# Patient Record
Sex: MALE | Race: WHITE | Employment: FULL TIME | URBAN - METROPOLITAN AREA
[De-identification: names, ages, dates, MRNs, and addresses within clinical notes are randomized per-mention and may not be internally consistent; named-entity substitution may affect disease eponyms.]

---

## 2020-01-14 ENCOUNTER — TELEPHONE (OUTPATIENT)
Dept: BARIATRICS/WEIGHT MGMT | Age: 45
End: 2020-01-14

## 2020-01-21 ENCOUNTER — HOSPITAL ENCOUNTER (OUTPATIENT)
Dept: WOUND CARE | Age: 45
Discharge: HOME | End: 2020-01-21
Payer: MEDICAID

## 2020-01-21 VITALS
SYSTOLIC BLOOD PRESSURE: 156 MMHG | HEART RATE: 82 BPM | RESPIRATION RATE: 15 BRPM | DIASTOLIC BLOOD PRESSURE: 97 MMHG | TEMPERATURE: 97.7 F

## 2020-01-21 PROBLEM — L97.929 TYPE 2 DIABETES MELLITUS WITH DIABETIC ULCER OF LEFT LOWER LEG (HCC): Status: ACTIVE | Noted: 2020-01-21

## 2020-01-21 PROBLEM — E11.622 TYPE 2 DIABETES MELLITUS WITH DIABETIC ULCER OF LEFT LOWER LEG (HCC): Status: ACTIVE | Noted: 2020-01-21

## 2020-01-21 PROBLEM — D84.9 IMMUNOSUPPRESSED STATUS (HCC): Status: ACTIVE | Noted: 2020-01-21

## 2020-01-21 PROCEDURE — 11042 DBRDMT SUBQ TIS 1ST 20SQCM/<: CPT

## 2020-01-21 PROCEDURE — 99203 OFFICE O/P NEW LOW 30 MIN: CPT | Performed by: NURSE PRACTITIONER

## 2020-01-21 PROCEDURE — 11042 DBRDMT SUBQ TIS 1ST 20SQCM/<: CPT | Performed by: NURSE PRACTITIONER

## 2020-01-21 PROCEDURE — 87071 CULTURE AEROBIC QUANT OTHER: CPT

## 2020-01-21 PROCEDURE — 87073 CULTURE BACTERIA ANAEROBIC: CPT

## 2020-01-21 PROCEDURE — 99213 OFFICE O/P EST LOW 20 MIN: CPT

## 2020-01-21 RX ORDER — FENOFIBRATE 48 MG/1
45 TABLET, COATED ORAL DAILY
COMMUNITY
Start: 2019-12-27 | End: 2020-02-11

## 2020-01-21 RX ORDER — FUROSEMIDE 40 MG/1
40 TABLET ORAL 2 TIMES DAILY
COMMUNITY
Start: 2019-12-19

## 2020-01-21 RX ORDER — VARENICLINE TARTRATE
0.5 KIT DAILY
COMMUNITY
Start: 2020-01-19

## 2020-01-21 RX ORDER — LABETALOL 200 MG/1
200 TABLET, FILM COATED ORAL 2 TIMES DAILY
COMMUNITY
Start: 2016-06-21

## 2020-01-21 RX ORDER — NIFEDIPINE 60 MG/1
60 TABLET, FILM COATED, EXTENDED RELEASE ORAL 2 TIMES DAILY
COMMUNITY
Start: 2016-09-20

## 2020-01-21 RX ORDER — GABAPENTIN 300 MG/1
300 CAPSULE ORAL 2 TIMES DAILY
COMMUNITY
Start: 2016-09-20

## 2020-01-21 RX ORDER — MYCOPHENOLIC ACID 360 MG/1
360 TABLET, DELAYED RELEASE ORAL 2 TIMES DAILY
COMMUNITY
Start: 2019-12-27

## 2020-01-21 RX ORDER — ENALAPRIL MALEATE 10 MG/1
10 TABLET ORAL NIGHTLY
COMMUNITY
Start: 2019-12-27 | End: 2020-02-11

## 2020-01-21 RX ORDER — SIROLIMUS 2 MG/1
2 TABLET, FILM COATED ORAL DAILY
COMMUNITY

## 2020-01-21 RX ORDER — HYDRALAZINE HYDROCHLORIDE 100 MG/1
100 TABLET, FILM COATED ORAL 2 TIMES DAILY
COMMUNITY
Start: 2018-05-22

## 2020-01-21 RX ORDER — PREDNISONE 10 MG/1
10 TABLET ORAL DAILY
COMMUNITY
Start: 2019-12-27

## 2020-01-21 NOTE — PROGRESS NOTES
[facility] Initial Visit      Juan Jose Noriega  AGE: 53 y.o. GENDER: male  : 1975  EPISODE DATE:  2020   Referred by: Self    Subjective:     CHIEF COMPLAINT WOUND LEFT LOWER LEG     HISTORY of PRESENT ILLNESS      Juan Jose Noriega is a 53 y.o. male who presents to the [address] [place] [address] for an initial visit for evaluation and treatment of Chronic diabetic  ulcer(s) of  Left lateral lower leg. The condition is of mild severity. The ulcer has been present for 10 weeks. The underlying cause is thought to be traumatic from the puncture of a nail into his left lower leg with delayed healing related to diabetes and immunosuppression. The patients care to date has included nothing. The patient is a kidney transplant patient and has had his Tetanus as part of the protocol. The patient has significant underlying medical conditions as below. Wound Pain Timing/Severity: mild  Quality of pain: aching  Severity of pain:  2 / 10   Modifying Factors: diabetes, smoking and immunosuppression  Associated Signs/Symptoms: pain        PAST MEDICAL HISTORY        Diagnosis Date    [facility] ([facility] [address] [facility]     Diabetes mellitus ([facility] [address] [facility]     Hyperlipidemia     Hypertension        PAST SURGICAL HISTORY    Past Surgical History:   Procedure Laterality Date    KIDNEY REMOVAL Left     KIDNEY TRANSPLANT         FAMILY HISTORY    History reviewed. No pertinent family history. SOCIAL HISTORY    Social History     Tobacco Use    Smoking status: Current Every Day Smoker     Packs/day: 1.00     Years: 30.00     Pack years: 30.00    Smokeless tobacco: Never Used   Substance Use Topics    Alcohol use: Not Currently    Drug use: Not on file       ALLERGIES    No Known Allergies    MEDICATIONS    No current outpatient medications on file prior to encounter. No current facility-administered medications on file prior to encounter.         PROBLEM LIST    Patient Active Problem List   Diagnosis    WD-Type 2 diabetes mellitus with diabetic ulcer of left lower leg (HCC)       REVIEW OF SYSTEMS    Constitutional: negative for anorexia, chills, fatigue, fevers, malaise and sweats  Respiratory: negative for cough, dyspnea on exertion, pleurisy/chest pain, shortness of breath, sputum, stridor and wheezing  Cardiovascular: negative for chest pain, chest pressure/discomfort, exertional chest pressure/discomfort, fatigue, irregular heart beat, palpitations, syncope and tachypnea  Integument/breast: positive for skin lesion(s)      Objective:      BP (!) 156/97   Pulse 82   Temp 97.7 °F (36.5 °C) (Temporal)   Resp 15     PHYSICAL EXAM  General Appearance: alert and oriented to person, place and time, well-developed and well-nourished, in no acute distress  Pulmonary/Chest: clear to auscultation bilaterally- no wheezes, rales or rhonchi, normal air movement, no respiratory distress  Cardiovascular: normal rate, normal S1 and S2, no gallops and intact distal pulses  Dermatologic exam: Visual inspection of the periwound reveals the skin to be normal in turgor and texture  Wound exam: see wound description below in procedure note      Assessment:       Juan Jose Noriega  appears to have a non-healing wound of the left lower lateral leg. The etiology of the wound is felt to be diabetic. There are multiple complicating factors including diabetes, smoking and anticoagulation therapy. A comprehensive wound management program would be helpful to heal this wound. Assessments completed include fall risk and nutritional, functional,and psychological status. At this time appropriate care would include: periodic debridement and wound care as below.      Problem List Items Addressed This Visit     WD-Type 2 diabetes mellitus with diabetic ulcer of left lower leg ([facility] [address] [facility]      Other Visit Diagnoses     Nonhealing skin ulcer, unspecified ulcer stage ([facility] [address]    -  Primary    Relevant Orders    VL DUP LOWER EXTREMITY ARTERIES BILATERAL    VL DUP LOWER EXTREMITY VENOUS BILATERAL    CBC WITH AUTO DIFFERENTIAL    Comprehensive Metabolic Panel, Fasting    HEMOGLOBIN A1C            Procedure Note    Indications:  Based on my examination of this patient's wound(s) today, sharp excision into necrotic subcutaneous tissue is required to promote healing and evaluate the extent of previous healing. Performed by: JOE Pruitt CNP    Consent obtained: Yes    Time out taken:  Yes    Pain Control: None needed       Debridement:Excisional Debridement    Using curette the wound(s) was/were sharply debrided down through and including the removal of subcutaneous tissue.         Devitalized Tissue Debrided:  fibrin, biofilm, slough, necrotic/eschar and exudate    Pre Debridement Measurements:  Are located in the Wound Documentation Flow Sheet    All active wounds listed below with today's date are evaluated  Wound(s)    debrided this date include # : 1     Post  Debridement Measurements:  Wound 01/21/20 Leg Posterior;Left;Lower #1 left posterior leg (Active)   Wound Image   1/21/2020 10:28 AM   Wound Cleansed Rinsed/Irrigated with saline 1/21/2020 10:28 AM   Wound Length (cm) 0.8 cm 1/21/2020 10:28 AM   Wound Width (cm) 0.5 cm 1/21/2020 10:28 AM   Wound Depth (cm) 0.1 cm 1/21/2020 10:28 AM   Wound Surface Area (cm^2) 0.4 cm^2 1/21/2020 10:28 AM   Wound Volume (cm^3) 0.04 cm^3 1/21/2020 10:28 AM   Post-Procedure Length (cm) 0.8 cm 1/21/2020 11:07 AM   Post-Procedure Width (cm) 0.5 cm 1/21/2020 11:07 AM   Post-Procedure Depth (cm) 0.1 cm 1/21/2020 11:07 AM   Post-Procedure Surface Area (cm^2) 0.4 cm^2 1/21/2020 11:07 AM   Post-Procedure Volume (cm^3) 0.04 cm^3 1/21/2020 11:07 AM   Distance Tunneling (cm) 0 cm 1/21/2020 10:28 AM   Tunneling Position ___ O'Clock 0 1/21/2020 10:28 AM   Undermining Starts ___ O'Clock 0 1/21/2020 10:28 AM   Undermining Ends___ O'Clock 0 1/21/2020 10:28 AM   Undermining Maxium Distance (cm) 0 1/21/2020 10:28 AM   Wound Assessment Nelson;Dry 1/21/2020 10:28 AM   Drainage Amount ordered by physician do not stop taking until medicine is all gone. Orders for this week:  1/21/20  Culture Obtained- 1/21/20  Arterial and Venous Studies ordered 1/21/20       Left Lower Leg--Clean with soap and water, pat dry. Apply Santyl Nickel thick to wound bed, cover with Fibracol and Border Gauze. Change Daily     Follow up with Yasmin CARTWRIGHT In 1 weeks in the wound care center  Call [phone] for any questions or concerns.   Date__________   Time____________        Treatment Note      Written Patient Dismissal Instructions Given          Electronically signed by JOE Pruitt CNP on 1/21/2020 at 11:16 AM

## 2020-01-24 ENCOUNTER — HOSPITAL ENCOUNTER (OUTPATIENT)
Dept: ULTRASOUND IMAGING | Age: 45
Discharge: HOME | End: 2020-01-24
Payer: COMMERCIAL

## 2020-01-24 PROCEDURE — 93970 EXTREMITY STUDY: CPT

## 2020-01-24 PROCEDURE — 93925 LOWER EXTREMITY STUDY: CPT

## 2020-01-25 LAB
CULTURE: NORMAL
Lab: NORMAL
SPECIMEN: NORMAL

## 2020-01-28 ENCOUNTER — HOSPITAL ENCOUNTER (OUTPATIENT)
Dept: WOUND CARE | Age: 45
Discharge: HOME | End: 2020-01-28
Payer: MEDICAID

## 2020-01-28 VITALS — DIASTOLIC BLOOD PRESSURE: 88 MMHG | SYSTOLIC BLOOD PRESSURE: 149 MMHG | RESPIRATION RATE: 18 BRPM | HEART RATE: 81 BPM

## 2020-01-28 PROCEDURE — 11042 DBRDMT SUBQ TIS 1ST 20SQCM/<: CPT

## 2020-01-28 PROCEDURE — 11042 DBRDMT SUBQ TIS 1ST 20SQCM/<: CPT | Performed by: NURSE PRACTITIONER

## 2020-01-28 NOTE — PROGRESS NOTES
Nonselective enzymatic debridement performed with Santyl per physician order to wound(s) of the left leg. Patient tolerated the procedure well.

## 2020-01-28 NOTE — PROGRESS NOTES
hydrALAZINE (APRESOLINE) 100 MG tablet Take 100 mg by mouth 2 times daily      labetalol (NORMODYNE) 200 MG tablet Take 200 mg by mouth 2 times daily      Mycophenolate Sodium 360 MG TBEC Take 360 mg by mouth 2 times daily      NIFEdipine (ADALAT CC) 60 MG extended release tablet Take 60 mg by mouth 2 times daily      predniSONE (DELTASONE) 10 MG tablet Take 10 mg by mouth daily      Sirolimus 2 MG TABS Take 2 mg by mouth daily      CHANTIX STARTING MONTH CINDY 0.5 MG X 11 & 1 MG X 42 tablet Take 0.5 mg by mouth daily       No current facility-administered medications on file prior to encounter. REVIEW OF SYSTEMS    Pertinent items are noted in HPI. Constitutional: Negative for systemic symptoms including fever, chills and malaise. Objective:      BP (!) 149/88   Pulse 81   Resp 18     PHYSICAL EXAM    General: The patient is in no acute distress. Mental status:  Patient is appropriate, is  oriented to place and plan of care. Dermatologic exam: Visual inspection of the periwound reveals the skin to be normal in turgor and texture  Wound exam: see wound description below in procedure note      Assessment:     Problem List Items Addressed This Visit     WD-Type 2 diabetes mellitus with diabetic ulcer of left lower leg ([facility] [address] - Primary    Immunosuppressed status ([facility] [address]        Procedure Note    Indications:  Based on my examination of this patient's wound(s) today, sharp excision into necrotic subcutaneous tissue is required to promote healing and evaluate the extent of previous healing. Performed by: JOE Otoole - CNP    Consent obtained: Yes    Time out taken:  Yes    Pain Control: 4% Lidocaine Liquid Topical     Debridement:Excisional Debridement    Using curette the wound(s) was/were sharply debrided down through and including the removal of subcutaneous tissue.         Devitalized Tissue Debrided:  biofilm, slough and exudate    Pre Debridement Measurements:  Are located in the standing for long periods of time. Apply wraps/stockings in AM and remove at bedtime. If swelling is present, elevate legs to the level of the heart or above for 30 minutes 4-5 times a day and/or when sitting. When taking antibiotics take entire prescription as ordered by physician do not stop taking until medicine is all gone.                                                           Orders for this week:  1/28/20  Culture Obtained- 1/21/20  Arterial and Venous Studies ordered 1/21/20                   Left Lower Leg--Clean with soap and water, pat dry. Apply Santyl Nickel thick to wound bed, cover with Fibracol and Border Gauze. Change Daily      Follow up with Yasmin CARTWRIGHT In 1 weeks in the wound care center  Call [phone] for any questions or concerns.   Date__________   Time____________        Treatment Note      Written Patient Dismissal Instructions Given            Electronically signed by JOE Hill CNP on 1/28/2020 at 10:53 AM

## 2020-02-06 ENCOUNTER — HOSPITAL ENCOUNTER (OUTPATIENT)
Dept: WOUND CARE | Age: 45
Discharge: HOME | End: 2020-02-06
Payer: COMMERCIAL

## 2020-02-06 VITALS
TEMPERATURE: 98.6 F | RESPIRATION RATE: 18 BRPM | DIASTOLIC BLOOD PRESSURE: 81 MMHG | SYSTOLIC BLOOD PRESSURE: 134 MMHG | HEART RATE: 81 BPM

## 2020-02-06 PROCEDURE — 11042 DBRDMT SUBQ TIS 1ST 20SQCM/<: CPT | Performed by: NURSE PRACTITIONER

## 2020-02-06 PROCEDURE — 11042 DBRDMT SUBQ TIS 1ST 20SQCM/<: CPT

## 2020-02-06 NOTE — PROGRESS NOTES
wounds listed below with today's date are evaluated  Wound(s)    debrided this date include # : 1     Post  Debridement Measurements:  Wound 01/21/20 Leg Posterior;Left;Lower #1 left posterior leg (Active)   Wound Image   2/6/2020  3:14 PM   Wound Diabetic Daily 2 2/6/2020  3:14 PM   Dressing Status Clean;Dry; Intact 1/28/2020 11:02 AM   Dressing Changed Changed/New 1/28/2020 11:02 AM   Wound Cleansed Rinsed/Irrigated with saline 2/6/2020  3:14 PM   Wound Length (cm) 1 cm 2/6/2020  3:14 PM   Wound Width (cm) 0.5 cm 2/6/2020  3:14 PM   Wound Depth (cm) 0.1 cm 2/6/2020  3:14 PM   Wound Surface Area (cm^2) 0.5 cm^2 2/6/2020  3:14 PM   Change in Wound Size % (l*w) -25 2/6/2020  3:14 PM   Wound Volume (cm^3) 0.05 cm^3 2/6/2020  3:14 PM   Wound Healing % -25 2/6/2020  3:14 PM   Post-Procedure Length (cm) 0.8 cm 1/21/2020 11:07 AM   Post-Procedure Width (cm) 0.5 cm 1/21/2020 11:07 AM   Post-Procedure Depth (cm) 0.1 cm 1/21/2020 11:07 AM   Post-Procedure Surface Area (cm^2) 0.4 cm^2 1/21/2020 11:07 AM   Post-Procedure Volume (cm^3) 0.04 cm^3 1/21/2020 11:07 AM   Distance Tunneling (cm) 0 cm 2/6/2020  3:14 PM   Tunneling Position ___ O'Clock 0 2/6/2020  3:14 PM   Undermining Starts ___ O'Clock 0 2/6/2020  3:14 PM   Undermining Ends___ O'Clock 0 2/6/2020  3:14 PM   Undermining Maxium Distance (cm) 0 2/6/2020  3:14 PM   Wound Assessment Pink;Yellow 2/6/2020  3:14 PM   Drainage Amount Moderate 2/6/2020  3:14 PM   Drainage Description Serosanguinous 2/6/2020  3:14 PM   Odor None 2/6/2020  3:14 PM   Margins Defined edges 2/6/2020  3:14 PM   Ann-Marie-wound Assessment Pink 2/6/2020  3:14 PM   Non-staged Wound Description Full thickness 2/6/2020  3:14 PM   [place]%Wound Bed 20 2/6/2020  3:14 PM   Red%Wound Bed 0 2/6/2020  3:14 PM   Yellow%Wound Bed 80 2/6/2020  3:14 PM   Black%Wound Bed 0 2/6/2020  3:14 PM   Purple%Wound Bed 0 2/6/2020  3:14 PM   Other%Wound Bed 0 2/6/2020  3:14 PM   Number of days: 16       Percent of Wound(s) Debrided: approximately 100%    Total  Area  Debrided:  0.5 sq cm     Bleeding:  None    Hemostasis Achieved:  not needed    Procedural Pain:  0  / 10     Post Procedural Pain:  0 / 10     Response to treatment:  Well tolerated by patient. Status of wound progress and description from last visit: Improved, . Plan:       Discharge Instructions       PHYSICIAN ORDERS AND DISCHARGE INSTRUCTIONS     NOTE: Upon discharge from the [address], you will receive a patient experience survey. We would be grateful if you would take the time to fill this survey out.     Wound care order history:                 Studies completed no arterial or venous issues at this time 1/28/20              Cultures:                Grafts:                Antibiotics:           Continuing wound care orders and information:                 Residence:  Home              Continue home health care with:               Your wound-care supplies will be provided by:  [facility]     Wound cleansing:               KE not scrub or use excessive force.              Wash hands with soap and water before and after dressing changes.               BQYHR to applying a clean dressing, cleanse wound with normal saline, wound cleanser, or mild soap and water.               Ask the physician or nurse before getting the wound(s) wet in a shower     Daily Wound management:              Keep weight off wounds and reposition every 2 hours.              Avoid standing for long periods of time.              Apply wraps/stockings in AM and remove at bedtime.              If swelling is present, elevate legs to the level of the heart or above for 30 minutes 4-5 times a day and/or when sitting.                                      When taking antibiotics take entire prescription as ordered by physician do not stop taking until medicine is all gone.                                                           Orders for this week:  2/6/20  Culture Obtained- 1/21/20       Left Lower Leg--Clean with soap and water, pat dry.  Apply Santyl Nickel thick to wound bed, cover with Fibracol and Border Gauze. Change Daily      Follow up with Yasmin CARTWRIGHT In 1 weeks in the wound care center  Call [phone] for any questions or concerns.   Date__________   Time____________        Treatment Note      Written Patient Dismissal Instructions Given            Electronically signed by JOE Casillas CNP on 2/6/2020 at 3:44 PM

## 2020-02-06 NOTE — PLAN OF CARE
Problem: Wound:  Goal: Will show signs of wound healing; wound closure and no evidence of infection  Description  Will show signs of wound healing; wound closure and no evidence of infection  Outcome: Ongoing  Note:   SEE fLOWSHEET     Problem: Wound:  Intervention: Assess ankle, calf, or foot circumference blilaterally  Note:   SEE fLOWSHEET  Intervention: Assess pain status  Note:   SEE fLOWSHEET  Intervention: Assess wound size, appearance and drainage  Note:   SEE fLOWSHEET  Intervention: Assess pedal pulses bilaterally if patient has a foot or leg ulcer  Note:   SEE fLOWSHEET  Intervention: Doppler if unable to palpate pedal pulse  Note:   SEE fLOWSHEET

## 2020-02-11 ENCOUNTER — OFFICE VISIT (OUTPATIENT)
Dept: BARIATRICS/WEIGHT MGMT | Age: 45
End: 2020-02-11
Payer: MEDICAID

## 2020-02-11 VITALS
WEIGHT: 265.8 LBS | DIASTOLIC BLOOD PRESSURE: 80 MMHG | OXYGEN SATURATION: 97 % | SYSTOLIC BLOOD PRESSURE: 160 MMHG | HEIGHT: 74 IN | BODY MASS INDEX: 34.11 KG/M2 | HEART RATE: 72 BPM

## 2020-02-11 PROCEDURE — G8484 FLU IMMUNIZE NO ADMIN: HCPCS | Performed by: SURGERY

## 2020-02-11 PROCEDURE — 4004F PT TOBACCO SCREEN RCVD TLK: CPT | Performed by: SURGERY

## 2020-02-11 PROCEDURE — G8428 CUR MEDS NOT DOCUMENT: HCPCS | Performed by: SURGERY

## 2020-02-11 PROCEDURE — G8417 CALC BMI ABV UP PARAM F/U: HCPCS | Performed by: SURGERY

## 2020-02-11 PROCEDURE — 99203 OFFICE O/P NEW LOW 30 MIN: CPT | Performed by: SURGERY

## 2020-02-11 ASSESSMENT — ENCOUNTER SYMPTOMS
EYES NEGATIVE: 1
COLOR CHANGE: 1
GASTROINTESTINAL NEGATIVE: 1
RESPIRATORY NEGATIVE: 1
BACK PAIN: 1

## 2020-02-11 NOTE — PROGRESS NOTES
Chief Complaint   Patient presents with    New Patient     Consult for sebaceous cyst on back ref. Ute Loya         SUBJECTIVE:  HPI: Patient is here with complaints of:    1. Soft tissue mass on back  -Present for 10-12 years but recently increasing in size. -A/w pressure. Denies pain on 10 pt scale. Does not radiate.  -No other symptoms.  -Nothing improves or alleviates. -Reports having hx of \"cysts. \"    2. Multiple skin tags on back  -Tender  -Have been present for years  -Denies other symptoms    Pt recently started on Xarelto for new DVT. Pt also has renal TXP for hx of RCC. Follows with Kidney specialist in [place], [place]. I have reviewed the patient's(pertinent information to this visit) medical history, family history(scanned in  the [address] [place] [address] under \"patient questioner\"), social history and review of systems with the patient today in the office. Past Surgical History:   Procedure Laterality Date    KIDNEY REMOVAL Left 2011    KIDNEY TRANSPLANT       Past Medical History:   Diagnosis Date    [facility] ([facility] [address]     Diabetes mellitus ([facility] [address] [facility]     Hyperlipidemia     Hypertension      No family history on file.   Social History     Socioeconomic History    Marital status:      Spouse name: Not on file    Number of children: 10    Years of education: Not on file    Highest education level: Not on file   Occupational History    Not on file   Social Needs    Financial resource strain: Not on file    Food insecurity:     Worry: Not on file     Inability: Not on file    Transportation needs:     Medical: Not on file     Non-medical: Not on file   Tobacco Use    Smoking status: Current Every Day Smoker     Packs/day: 1.00     Years: 30.00     Pack years: 30.00    Smokeless tobacco: Never Used    Tobacco comment: Pts 3rd time on chantix Jan 2020   Substance and Sexual Activity    Alcohol use: Not Currently    Drug use: Not on file    Sexual activity: Not on file   Lifestyle    Physical activity:     Days per week: Not on file     Minutes per session: Not on file    Stress: Not on file   Relationships    Social connections:     Talks on phone: Not on file     Gets together: Not on file     Attends [employer] service: Not on file     Active member of club or organization: Not on file     Attends meetings of clubs or organizations: Not on file     Relationship status: Not on file    Intimate partner violence:     Fear of current or ex partner: Not on file     Emotionally abused: Not on file     Physically abused: Not on file     Forced sexual activity: Not on file   Other Topics Concern    Not on file   Social History Narrative    Not on file       Current Outpatient Medications   Medication Sig Dispense Refill    Dulaglutide (TRULICITY) 1.5 VI/2.1JL SOPN Inject 1.5 mg into the skin once a week      furosemide (LASIX) 40 MG tablet Take 40 mg by mouth 2 times daily      gabapentin (NEURONTIN) 300 MG capsule Take 300 mg by mouth 2 times daily.  hydrALAZINE (APRESOLINE) 100 MG tablet Take 100 mg by mouth 2 times daily      labetalol (NORMODYNE) 200 MG tablet Take 200 mg by mouth 2 times daily      Mycophenolate Sodium 360 MG TBEC Take 360 mg by mouth 2 times daily      NIFEdipine (ADALAT CC) 60 MG extended release tablet Take 60 mg by mouth 2 times daily      predniSONE (DELTASONE) 10 MG tablet Take 10 mg by mouth daily      Sirolimus 2 MG TABS Take 2 mg by mouth daily      CHANTIX STARTING MONTH CINDY 0.5 MG X 11 & 1 MG X 42 tablet Take 0.5 mg by mouth daily       No current facility-administered medications for this visit. Allergies   Allergen Reactions    Niacin Itching    Niacin Er Itching       Review of Systems:         Review of Systems   Constitutional: Negative. HENT: Negative. Eyes: Negative. Respiratory: Negative. Cardiovascular: Negative. Gastrointestinal: Negative. Endocrine: Negative. Genitourinary: Negative.     Musculoskeletal:

## 2020-02-13 ENCOUNTER — HOSPITAL ENCOUNTER (OUTPATIENT)
Dept: WOUND CARE | Age: 45
Discharge: HOME | End: 2020-02-13
Payer: COMMERCIAL

## 2020-02-13 VITALS
RESPIRATION RATE: 18 BRPM | DIASTOLIC BLOOD PRESSURE: 91 MMHG | TEMPERATURE: 98.6 F | HEART RATE: 71 BPM | SYSTOLIC BLOOD PRESSURE: 159 MMHG

## 2020-02-13 PROCEDURE — 11042 DBRDMT SUBQ TIS 1ST 20SQCM/<: CPT | Performed by: NURSE PRACTITIONER

## 2020-02-13 PROCEDURE — 11042 DBRDMT SUBQ TIS 1ST 20SQCM/<: CPT

## 2020-02-13 NOTE — PROGRESS NOTES
Nonselective enzymatic debridement performed with Santyl per physician order to wound(s) of the Rt  foot Patient tolerated the procedure well.

## 2020-02-14 NOTE — PROGRESS NOTES
Wound Care Center Progress Note With Procedure    Juan Jose Noriega  AGE: 53 y.o. GENDER: male  : 1975  EPISODE DATE:  2020     Subjective:     Chief Complaint   Patient presents with    Wound Check     left leg          HISTORY of PRESENT ILLNESS      Juan Jose Noriega is a 53 y.o. male who presents today for wound evaluation of Chronic diabetic wound of the left lateral lower leg. The wound is of mild severity. The underlying cause of the wound is trauma  from a nail puncture with delayed healing related to diabetes and immunosuppression. Wound Pain Timing/Severity: none  Quality of pain: N/A  Severity of pain:  0 / 10   Modifying Factors: diabetes, smoking and immunosuppression  Associated Signs/Symptoms: none        PAST MEDICAL HISTORY        Diagnosis Date    [facility] ([facility] [address] [facility]     Diabetes mellitus ([facility]     Hyperlipidemia     Hypertension        PAST SURGICAL HISTORY    Past Surgical History:   Procedure Laterality Date    KIDNEY REMOVAL Left     KIDNEY TRANSPLANT         FAMILY HISTORY    History reviewed. No pertinent family history. SOCIAL HISTORY    Social History     Tobacco Use    Smoking status: Current Every Day Smoker     Packs/day: 1.00     Years: 30.00     Pack years: 30.00    Smokeless tobacco: Never Used    Tobacco comment: Pts 3rd time on chantix 2020   Substance Use Topics    Alcohol use: Not Currently    Drug use: Not on file       ALLERGIES    Allergies   Allergen Reactions    Niacin Itching    Niacin Er Itching       MEDICATIONS    Current Outpatient Medications on File Prior to Encounter   Medication Sig Dispense Refill    Dulaglutide (TRULICITY) 1.5 GN/2.6MX SOPN Inject 1.5 mg into the skin once a week      furosemide (LASIX) 40 MG tablet Take 40 mg by mouth 2 times daily      gabapentin (NEURONTIN) 300 MG capsule Take 300 mg by mouth 2 times daily.       hydrALAZINE (APRESOLINE) 100 MG tablet Take 100 mg by mouth 2 times daily      labetalol (NORMODYNE) 200 with soap and water, pat dry.  Apply Santyl Nickel thick to wound bed, cover with Fibracol and Border Gauze. Change Daily      Follow up with Yasmin CARTWRIGHT In 1 weeks in the wound care center  Call [phone] for any questions or concerns.   Date__________   Time____________        Treatment Note Wound 01/21/20 Leg Posterior;Left;Lower #1 left posterior leg-Dressing/Treatment: (santyl;fibracol;border gauze)    Written Patient Dismissal Instructions Given            Electronically signed by JOE Mcgill CNP on 2/14/2020 at 8:08 AM

## 2020-02-18 ENCOUNTER — TELEPHONE (OUTPATIENT)
Dept: BARIATRICS/WEIGHT MGMT | Age: 45
End: 2020-02-18

## 2020-02-19 ENCOUNTER — ANESTHESIA EVENT (OUTPATIENT)
Dept: OPERATING ROOM | Age: 45
End: 2020-02-19
Payer: COMMERCIAL

## 2020-02-20 ENCOUNTER — HOSPITAL ENCOUNTER (OUTPATIENT)
Dept: WOUND CARE | Age: 45
Discharge: HOME | End: 2020-02-20
Payer: COMMERCIAL

## 2020-02-20 VITALS
HEART RATE: 73 BPM | DIASTOLIC BLOOD PRESSURE: 85 MMHG | RESPIRATION RATE: 18 BRPM | SYSTOLIC BLOOD PRESSURE: 135 MMHG | TEMPERATURE: 98.3 F

## 2020-02-20 PROCEDURE — 11042 DBRDMT SUBQ TIS 1ST 20SQCM/<: CPT | Performed by: NURSE PRACTITIONER

## 2020-02-20 PROCEDURE — 11042 DBRDMT SUBQ TIS 1ST 20SQCM/<: CPT

## 2020-02-20 NOTE — ANESTHESIA PRE PROCEDURE
is elevated at 45-50 mmHg c/w mild pulmonary HTN      Stress   EF 51%  IMPRESSION: (Conclusions)     1.  Pharmacologic stress myocardial perfusion scan shows SCAR, BUT NO EVIDENCE OF ISCHEMIA. 2.  Fixed, inferobasilar perfusion defect with associated akinesis, consistent with prior infarct. 3.  Inferoseptal akinesis. 4.  Mildly reduced left ventricular systolic function      CARDIOVASCULAR:  Normal apical impulse, regular rate and rhythm, normal S1 and S2, no S3 or S4, and no murmur noted    CP or SOB: NO   Exercise: walks 3 x per day 30 min per day     EK2020  Sinus rhythm with 1st degree AV block   Left axis deviation   Left bundle branch block   2020 noted on EKG in Care Everywhere     Neuro/Psych:               GI/Hepatic/Renal:   (+) renal disease (RCC s/p left nephrectomy, . ESRD Peritoneal dialysis, . right -donor kidney transplant, . On steroids dep. Belatacept infusions until 2019. Myfortic and sirolimus now for immunosuppression Cystoscopy, . CR: 1.7, 2020):, morbid obesity (BMI: 35 )          Endo/Other:    (+) Diabetes (Random PAT BS:  202)Type II DM, , hyperthyroidism ( hyperparathyroidism), blood dyscrasia (JANA, anemia of chronic dz)::., .          Pt had PAT visit. ROS comment:  sebaceous cyst on back     Closed displaced fracture of proximal phalanx of lesser toe of right foot, 2019    Hx Psoriasis  Abdominal:           Vascular:   + DVT (Superficial LLE thrombus extending into common femoral, 2020  On Xarelto, last dose to be 2019), . Anesthesia Plan      MAC     ASA 3       Induction: intravenous. Anesthetic plan and risks discussed with patient. Plan discussed with CRNA. Attending anesthesiologist reviewed and agrees with Pre Eval content              Tiffanie Sandoval, APRN - CNP  Chart reviewed, pt. Interviewed and examined.   Anesthesia Evaluation will follow by a certified practitioner prior to surgery.   2/20/2020

## 2020-02-20 NOTE — PROGRESS NOTES
Wound Care Center Progress Note With Procedure    Juan Jose Noriega  AGE: 53 y.o. GENDER: male  : 1975  EPISODE DATE:  2020     Subjective:     Chief Complaint   Patient presents with    Wound Check     left leg          HISTORY of PRESENT ILLNESS      Juan Jose Noriega is a 53 y.o. male who presents today for wound evaluation of Chronic diabetic wound of the left lateral lower leg. The ulcer is of mild severity. The underlying cause of the wound is diabetes and immunosuppression. Wound Pain Timing/Severity: none  Quality of pain: N/A  Severity of pain:  0 / 10   Modifying Factors: diabetes, smoking and immunosuppression  Associated Signs/Symptoms: none        PAST MEDICAL HISTORY        Diagnosis Date    CAD (coronary artery disease)     stent to RCA, 2013    [facility]     Diabetes mellitus ([facility]     DVT (deep vein thrombosis) in pregnancy 2020    superficial LLE thrombus extending into common femoral.    ESRD (end stage renal disease) ([address]     Hyperlipidemia     Hypertension     Renal cell carcinoma ([facility] [address] [facility] 2012       PAST SURGICAL HISTORY    Past Surgical History:   Procedure Laterality Date    KIDNEY REMOVAL Left     KIDNEY TRANSPLANT  2016    rejected    PTCA      Stent to RCA       FAMILY HISTORY    History reviewed. No pertinent family history.     SOCIAL HISTORY    Social History     Tobacco Use    Smoking status: Current Every Day Smoker     Packs/day: 1.00     Years: 30.00     Pack years: 30.00    Smokeless tobacco: Never Used    Tobacco comment: Pts 3rd time on chantix 2020   Substance Use Topics    Alcohol use: Not Currently    Drug use: Not on file       ALLERGIES    Allergies   Allergen Reactions    Niacin Itching    Niacin Er Itching       MEDICATIONS    Current Outpatient Medications on File Prior to Encounter   Medication Sig Dispense Refill    Dulaglutide (TRULICITY) 1.5 WD/7.8CM SOPN Inject 1.5 mg into the skin once a week      furosemide (LASIX) 40 MG tablet Take 40 mg by mouth 2 times daily      gabapentin (NEURONTIN) 300 MG capsule Take 300 mg by mouth 2 times daily.  hydrALAZINE (APRESOLINE) 100 MG tablet Take 100 mg by mouth 2 times daily      labetalol (NORMODYNE) 200 MG tablet Take 200 mg by mouth 2 times daily      Mycophenolate Sodium 360 MG TBEC Take 360 mg by mouth 2 times daily      NIFEdipine (ADALAT CC) 60 MG extended release tablet Take 60 mg by mouth 2 times daily      predniSONE (DELTASONE) 10 MG tablet Take 10 mg by mouth daily      Sirolimus 2 MG TABS Take 2 mg by mouth daily      CHANTIX STARTING MONTH CINDY 0.5 MG X 11 & 1 MG X 42 tablet Take 0.5 mg by mouth daily       No current facility-administered medications on file prior to encounter. REVIEW OF SYSTEMS    Pertinent items are noted in HPI. Constitutional: Negative for systemic symptoms including fever, chills and malaise. Objective:      /85   Pulse 73   Temp 98.3 °F (36.8 °C) (Temporal)   Resp 18     PHYSICAL EXAM  General: The patient is in no acute distress. Mental status:  Patient is appropriate, is  oriented to place and plan of care. Dermatologic exam: Visual inspection of the periwound reveals the skin to be normal in turgor and texture  Wound exam: see wound description below in procedure note      Assessment:     Problem List Items Addressed This Visit     WD-Type 2 diabetes mellitus with diabetic ulcer of left lower leg ([facility] [facility]    Immunosuppressed status ([facility] [address] - Primary        Procedure Note    Indications:  Based on my examination of this patient's wound(s) today, sharp excision into necrotic subcutaneous tissue is required to promote healing and evaluate the extent of previous healing.     Performed by: JOE Barbosa - CNP    Consent obtained: Yes    Time out taken:  Yes    Pain Control: Anesthetic  Anesthetic: 4% Lidocaine Liquid Topical     Debridement:Excisional Debridement    Using curette the wound(s) was/were sharply debrided 2/20/2020  3:03 PM   Yellow%Wound Bed 100 2/20/2020  3:03 PM   Black%Wound Bed 0 2/20/2020  3:03 PM   Purple%Wound Bed 0 2/20/2020  3:03 PM   Other%Wound Bed 0 2/20/2020  3:03 PM   Number of days: 30       Percent of Wound(s) Debrided: approximately 100%    Total  Area  Debrided:  0.1 sq cm     Bleeding:  Minimal    Hemostasis Achieved:  by pressure    Procedural Pain:  1  / 10     Post Procedural Pain:  0 / 10     Response to treatment:  Well tolerated by patient. Status of wound progress and description from last visit: Improving. Plan:       Discharge Instructions       PHYSICIAN ORDERS AND DISCHARGE INSTRUCTIONS     NOTE: Upon discharge from the [address], you will receive a patient experience survey. We would be grateful if you would take the time to fill this survey out.     Wound care order history:                 Studies completed no arterial or venous issues at this time 1/28/20              Cultures:                Grafts:                Antibiotics:           Continuing wound care orders and information:                 Residence:  Home              Continue home health care with:               Your wound-care supplies will be provided by:  [facility]     Wound cleansing:               XG not scrub or use excessive force.              Wash hands with soap and water before and after dressing changes.               BEMQE to applying a clean dressing, cleanse wound with normal saline, wound cleanser, or mild soap and water.               Ask the physician or nurse before getting the wound(s) wet in a shower     Daily Wound management:              Keep weight off wounds and reposition every 2 hours.              Avoid standing for long periods of time.              Apply wraps/stockings in AM and remove at bedtime.              If swelling is present, elevate legs to the level of the heart or above for 30 minutes 4-5 times a day and/or when sitting.                                      When taking antibiotics take entire prescription as ordered by physician do not stop taking until medicine is all gone.                                                           Orders for this week:  2/20/20  Culture Obtained- 1/21/20        Left Lower Leg--Clean with soap and water, pat dry.  Apply Santyl Nickel thick to wound bed, cover with Fibracol and Border Gauze. Change Daily      Follow up with Yasmin CARTWRIGHT In 1 weeks in the wound care center  Call [phone] for any questions or concerns.   Date__________   Time____________        Treatment Note      Written Patient Dismissal Instructions Given            Electronically signed by JOE Ortiz CNP on 2/20/2020 at 3:17 PM

## 2020-02-20 NOTE — PROGRESS NOTES
Nonselective enzymatic debridement performed with Santyl per physician order to wound(s) of the Left Lower Leg  Patient tolerated the procedure well.    Electronically signed by Sb Anderson RN on 2/20/2020 at 3:44 PM

## 2020-02-24 ENCOUNTER — HOSPITAL ENCOUNTER (OUTPATIENT)
Dept: PREADMISSION TESTING | Age: 45
Discharge: HOME | End: 2020-02-28
Payer: COMMERCIAL

## 2020-02-24 VITALS
OXYGEN SATURATION: 96 % | WEIGHT: 265 LBS | TEMPERATURE: 97.4 F | SYSTOLIC BLOOD PRESSURE: 138 MMHG | BODY MASS INDEX: 35.12 KG/M2 | HEART RATE: 76 BPM | RESPIRATION RATE: 16 BRPM | HEIGHT: 73 IN | DIASTOLIC BLOOD PRESSURE: 86 MMHG

## 2020-02-24 LAB
ANION GAP SERPL CALCULATED.3IONS-SCNC: 14 MMOL/L (ref 4–16)
BUN BLDV-MCNC: 21 MG/DL (ref 6–23)
CALCIUM SERPL-MCNC: 10.8 MG/DL (ref 8.3–10.6)
CHLORIDE BLD-SCNC: 98 MMOL/L (ref 99–110)
CO2: 26 MMOL/L (ref 21–32)
CREAT SERPL-MCNC: 1.7 MG/DL (ref 0.9–1.3)
EKG ATRIAL RATE: 70 BPM
EKG DIAGNOSIS: NORMAL
EKG P AXIS: 39 DEGREES
EKG P-R INTERVAL: 248 MS
EKG Q-T INTERVAL: 420 MS
EKG QRS DURATION: 140 MS
EKG QTC CALCULATION (BAZETT): 453 MS
EKG R AXIS: -69 DEGREES
EKG T AXIS: 107 DEGREES
EKG VENTRICULAR RATE: 70 BPM
GFR AFRICAN AMERICAN: 53 ML/MIN/1.73M2
GFR NON-AFRICAN AMERICAN: 44 ML/MIN/1.73M2
GLUCOSE BLD-MCNC: 202 MG/DL (ref 70–99)
HCT VFR BLD CALC: 48.8 % (ref 42–52)
HEMOGLOBIN: 15.8 GM/DL (ref 13.5–18)
MCH RBC QN AUTO: 28.2 PG (ref 27–31)
MCHC RBC AUTO-ENTMCNC: 32.4 % (ref 32–36)
MCV RBC AUTO: 87 FL (ref 78–100)
PDW BLD-RTO: 14.5 % (ref 11.7–14.9)
PLATELET # BLD: 203 K/CU MM (ref 140–440)
PMV BLD AUTO: 10.5 FL (ref 7.5–11.1)
POTASSIUM SERPL-SCNC: 3.7 MMOL/L (ref 3.5–5.1)
RBC # BLD: 5.61 M/CU MM (ref 4.6–6.2)
SODIUM BLD-SCNC: 138 MMOL/L (ref 135–145)
WBC # BLD: 11.7 K/CU MM (ref 4–10.5)

## 2020-02-24 PROCEDURE — 80048 BASIC METABOLIC PNL TOTAL CA: CPT

## 2020-02-24 PROCEDURE — 93010 ELECTROCARDIOGRAM REPORT: CPT | Performed by: INTERNAL MEDICINE

## 2020-02-24 PROCEDURE — 85027 COMPLETE CBC AUTOMATED: CPT

## 2020-02-24 PROCEDURE — 93005 ELECTROCARDIOGRAM TRACING: CPT | Performed by: NURSE PRACTITIONER

## 2020-02-24 PROCEDURE — 36415 COLL VENOUS BLD VENIPUNCTURE: CPT

## 2020-02-24 RX ORDER — FENOFIBRATE 48 MG/1
48 TABLET, COATED ORAL NIGHTLY
COMMUNITY

## 2020-02-24 ASSESSMENT — LIFESTYLE VARIABLES: SMOKING_STATUS: 1

## 2020-02-27 ENCOUNTER — HOSPITAL ENCOUNTER (OUTPATIENT)
Dept: WOUND CARE | Age: 45
Discharge: HOME | End: 2020-02-27
Payer: COMMERCIAL

## 2020-02-27 VITALS
HEART RATE: 82 BPM | SYSTOLIC BLOOD PRESSURE: 153 MMHG | TEMPERATURE: 99.7 F | DIASTOLIC BLOOD PRESSURE: 96 MMHG | RESPIRATION RATE: 16 BRPM

## 2020-02-27 PROCEDURE — G0463 HOSPITAL OUTPT CLINIC VISIT: HCPCS

## 2020-02-27 PROCEDURE — 99213 OFFICE O/P EST LOW 20 MIN: CPT | Performed by: NURSE PRACTITIONER

## 2020-02-27 PROCEDURE — 99213 OFFICE O/P EST LOW 20 MIN: CPT

## 2020-02-27 RX ORDER — LIDOCAINE HYDROCHLORIDE 40 MG/ML
SOLUTION TOPICAL ONCE
Status: DISCONTINUED | OUTPATIENT
Start: 2020-02-27 | End: 2020-02-28 | Stop reason: HOSPADM

## 2020-03-02 ENCOUNTER — HOSPITAL ENCOUNTER (OUTPATIENT)
Age: 45
Setting detail: OUTPATIENT SURGERY
Discharge: HOME | End: 2020-03-02
Attending: SURGERY | Admitting: SURGERY
Payer: COMMERCIAL

## 2020-03-02 ENCOUNTER — ANESTHESIA (OUTPATIENT)
Dept: OPERATING ROOM | Age: 45
End: 2020-03-02
Payer: COMMERCIAL

## 2020-03-02 VITALS
BODY MASS INDEX: 35.12 KG/M2 | RESPIRATION RATE: 16 BRPM | WEIGHT: 265 LBS | HEIGHT: 73 IN | DIASTOLIC BLOOD PRESSURE: 93 MMHG | SYSTOLIC BLOOD PRESSURE: 149 MMHG | OXYGEN SATURATION: 93 % | HEART RATE: 76 BPM | TEMPERATURE: 97.6 F

## 2020-03-02 VITALS
RESPIRATION RATE: 28 BRPM | SYSTOLIC BLOOD PRESSURE: 111 MMHG | DIASTOLIC BLOOD PRESSURE: 70 MMHG | OXYGEN SATURATION: 91 %

## 2020-03-02 LAB
GLUCOSE BLD-MCNC: 136 MG/DL (ref 70–99)
GLUCOSE BLD-MCNC: 170 MG/DL (ref 70–99)

## 2020-03-02 PROCEDURE — 7100000010 HC PHASE II RECOVERY - FIRST 15 MIN: Performed by: SURGERY

## 2020-03-02 PROCEDURE — 11404 EXC TR-EXT B9+MARG 3.1-4 CM: CPT | Performed by: SURGERY

## 2020-03-02 PROCEDURE — 82962 GLUCOSE BLOOD TEST: CPT

## 2020-03-02 PROCEDURE — 7100000011 HC PHASE II RECOVERY - ADDTL 15 MIN: Performed by: SURGERY

## 2020-03-02 PROCEDURE — 2580000003 HC RX 258: Performed by: ANESTHESIOLOGY

## 2020-03-02 PROCEDURE — 3600000012 HC SURGERY LEVEL 2 ADDTL 15MIN: Performed by: SURGERY

## 2020-03-02 PROCEDURE — 3700000001 HC ADD 15 MINUTES (ANESTHESIA): Performed by: SURGERY

## 2020-03-02 PROCEDURE — 3600000002 HC SURGERY LEVEL 2 BASE: Performed by: SURGERY

## 2020-03-02 PROCEDURE — 6360000002 HC RX W HCPCS: Performed by: SURGERY

## 2020-03-02 PROCEDURE — 2500000003 HC RX 250 WO HCPCS: Performed by: NURSE ANESTHETIST, CERTIFIED REGISTERED

## 2020-03-02 PROCEDURE — 12032 INTMD RPR S/A/T/EXT 2.6-7.5: CPT | Performed by: SURGERY

## 2020-03-02 PROCEDURE — 6360000002 HC RX W HCPCS: Performed by: NURSE ANESTHETIST, CERTIFIED REGISTERED

## 2020-03-02 PROCEDURE — 11200 RMVL SKIN TAGS UP TO&INC 15: CPT | Performed by: SURGERY

## 2020-03-02 PROCEDURE — 2500000003 HC RX 250 WO HCPCS: Performed by: SURGERY

## 2020-03-02 PROCEDURE — 3700000000 HC ANESTHESIA ATTENDED CARE: Performed by: SURGERY

## 2020-03-02 PROCEDURE — 88304 TISSUE EXAM BY PATHOLOGIST: CPT

## 2020-03-02 PROCEDURE — 7100000001 HC PACU RECOVERY - ADDTL 15 MIN: Performed by: SURGERY

## 2020-03-02 PROCEDURE — 2709999900 HC NON-CHARGEABLE SUPPLY: Performed by: SURGERY

## 2020-03-02 PROCEDURE — 7100000000 HC PACU RECOVERY - FIRST 15 MIN: Performed by: SURGERY

## 2020-03-02 RX ORDER — CEFAZOLIN SODIUM 1 G/50ML
INJECTION, SOLUTION INTRAVENOUS
Status: DISCONTINUED
Start: 2020-03-02 | End: 2020-03-02 | Stop reason: HOSPADM

## 2020-03-02 RX ORDER — MIDAZOLAM HYDROCHLORIDE 1 MG/ML
INJECTION INTRAMUSCULAR; INTRAVENOUS PRN
Status: DISCONTINUED | OUTPATIENT
Start: 2020-03-02 | End: 2020-03-02 | Stop reason: SDUPTHER

## 2020-03-02 RX ORDER — DEXAMETHASONE SODIUM PHOSPHATE 4 MG/ML
INJECTION, SOLUTION INTRA-ARTICULAR; INTRALESIONAL; INTRAMUSCULAR; INTRAVENOUS; SOFT TISSUE PRN
Status: DISCONTINUED | OUTPATIENT
Start: 2020-03-02 | End: 2020-03-02 | Stop reason: SDUPTHER

## 2020-03-02 RX ORDER — ONDANSETRON 2 MG/ML
4 INJECTION INTRAMUSCULAR; INTRAVENOUS
Status: DISCONTINUED | OUTPATIENT
Start: 2020-03-02 | End: 2020-03-02 | Stop reason: HOSPADM

## 2020-03-02 RX ORDER — LIDOCAINE HYDROCHLORIDE 20 MG/ML
INJECTION, SOLUTION INFILTRATION; PERINEURAL PRN
Status: DISCONTINUED | OUTPATIENT
Start: 2020-03-02 | End: 2020-03-02 | Stop reason: SDUPTHER

## 2020-03-02 RX ORDER — ONDANSETRON 2 MG/ML
INJECTION INTRAMUSCULAR; INTRAVENOUS PRN
Status: DISCONTINUED | OUTPATIENT
Start: 2020-03-02 | End: 2020-03-02 | Stop reason: SDUPTHER

## 2020-03-02 RX ORDER — ESMOLOL HYDROCHLORIDE 10 MG/ML
INJECTION INTRAVENOUS PRN
Status: DISCONTINUED | OUTPATIENT
Start: 2020-03-02 | End: 2020-03-02 | Stop reason: SDUPTHER

## 2020-03-02 RX ORDER — FENTANYL CITRATE 50 UG/ML
25 INJECTION, SOLUTION INTRAMUSCULAR; INTRAVENOUS EVERY 5 MIN PRN
Status: DISCONTINUED | OUTPATIENT
Start: 2020-03-02 | End: 2020-03-02 | Stop reason: HOSPADM

## 2020-03-02 RX ORDER — SODIUM CHLORIDE, SODIUM LACTATE, POTASSIUM CHLORIDE, CALCIUM CHLORIDE 600; 310; 30; 20 MG/100ML; MG/100ML; MG/100ML; MG/100ML
INJECTION, SOLUTION INTRAVENOUS
Status: DISCONTINUED
Start: 2020-03-02 | End: 2020-03-02 | Stop reason: HOSPADM

## 2020-03-02 RX ORDER — SUCCINYLCHOLINE/SOD CL,ISO/PF 100 MG/5ML
SYRINGE (ML) INTRAVENOUS PRN
Status: DISCONTINUED | OUTPATIENT
Start: 2020-03-02 | End: 2020-03-02 | Stop reason: SDUPTHER

## 2020-03-02 RX ORDER — AMOXICILLIN 250 MG
2 CAPSULE ORAL DAILY
Qty: 28 TABLET | Refills: 0 | Status: SHIPPED | OUTPATIENT
Start: 2020-03-02 | End: 2020-03-16

## 2020-03-02 RX ORDER — HYDRALAZINE HYDROCHLORIDE 20 MG/ML
5 INJECTION INTRAMUSCULAR; INTRAVENOUS EVERY 10 MIN PRN
Status: DISCONTINUED | OUTPATIENT
Start: 2020-03-02 | End: 2020-03-02 | Stop reason: HOSPADM

## 2020-03-02 RX ORDER — CEFAZOLIN SODIUM 2 G/100ML
2 INJECTION, SOLUTION INTRAVENOUS EVERY 8 HOURS
Status: DISCONTINUED | OUTPATIENT
Start: 2020-03-02 | End: 2020-03-02 | Stop reason: HOSPADM

## 2020-03-02 RX ORDER — HYDROCODONE BITARTRATE AND ACETAMINOPHEN 5; 325 MG/1; MG/1
1 TABLET ORAL EVERY 6 HOURS PRN
Qty: 7 TABLET | Refills: 0 | Status: SHIPPED | OUTPATIENT
Start: 2020-03-02 | End: 2020-03-05

## 2020-03-02 RX ORDER — LIDOCAINE HYDROCHLORIDE 10 MG/ML
INJECTION, SOLUTION EPIDURAL; INFILTRATION; INTRACAUDAL; PERINEURAL
Status: COMPLETED | OUTPATIENT
Start: 2020-03-02 | End: 2020-03-02

## 2020-03-02 RX ORDER — SODIUM CHLORIDE, SODIUM LACTATE, POTASSIUM CHLORIDE, CALCIUM CHLORIDE 600; 310; 30; 20 MG/100ML; MG/100ML; MG/100ML; MG/100ML
INJECTION, SOLUTION INTRAVENOUS ONCE
Status: COMPLETED | OUTPATIENT
Start: 2020-03-02 | End: 2020-03-02

## 2020-03-02 RX ORDER — ONDANSETRON 4 MG/1
4 TABLET, FILM COATED ORAL DAILY PRN
Qty: 20 TABLET | Refills: 0 | Status: SHIPPED | OUTPATIENT
Start: 2020-03-02

## 2020-03-02 RX ORDER — PROPOFOL 10 MG/ML
INJECTION, EMULSION INTRAVENOUS PRN
Status: DISCONTINUED | OUTPATIENT
Start: 2020-03-02 | End: 2020-03-02 | Stop reason: SDUPTHER

## 2020-03-02 RX ADMIN — Medication 20 MG: at 13:50

## 2020-03-02 RX ADMIN — PROPOFOL 200 MG: 10 INJECTION, EMULSION INTRAVENOUS at 13:50

## 2020-03-02 RX ADMIN — DEXAMETHASONE SODIUM PHOSPHATE 4 MG: 4 INJECTION, SOLUTION INTRAMUSCULAR; INTRAVENOUS at 13:57

## 2020-03-02 RX ADMIN — SODIUM CHLORIDE, POTASSIUM CHLORIDE, SODIUM LACTATE AND CALCIUM CHLORIDE: 600; 310; 30; 20 INJECTION, SOLUTION INTRAVENOUS at 13:45

## 2020-03-02 RX ADMIN — LIDOCAINE HYDROCHLORIDE 100 MG: 20 INJECTION, SOLUTION INFILTRATION; PERINEURAL at 13:50

## 2020-03-02 RX ADMIN — ESMOLOL HYDROCHLORIDE 50 MG: 10 INJECTION, SOLUTION INTRAVENOUS at 13:54

## 2020-03-02 RX ADMIN — CEFAZOLIN SODIUM 2 G: 2 INJECTION, SOLUTION INTRAVENOUS at 13:56

## 2020-03-02 RX ADMIN — MIDAZOLAM 2 MG: 1 INJECTION INTRAMUSCULAR; INTRAVENOUS at 13:45

## 2020-03-02 RX ADMIN — ONDANSETRON 4 MG: 2 INJECTION INTRAMUSCULAR; INTRAVENOUS at 14:09

## 2020-03-02 ASSESSMENT — PULMONARY FUNCTION TESTS
PIF_VALUE: 1
PIF_VALUE: 2
PIF_VALUE: 30
PIF_VALUE: 28
PIF_VALUE: 35
PIF_VALUE: 28
PIF_VALUE: 0
PIF_VALUE: 13
PIF_VALUE: 35
PIF_VALUE: 28
PIF_VALUE: 35
PIF_VALUE: 0
PIF_VALUE: 31
PIF_VALUE: 28
PIF_VALUE: 35
PIF_VALUE: 28
PIF_VALUE: 32
PIF_VALUE: 28
PIF_VALUE: 28
PIF_VALUE: 29
PIF_VALUE: 28
PIF_VALUE: 28
PIF_VALUE: 30
PIF_VALUE: 33
PIF_VALUE: 28
PIF_VALUE: 32
PIF_VALUE: 23
PIF_VALUE: 28
PIF_VALUE: 2
PIF_VALUE: 1
PIF_VALUE: 28
PIF_VALUE: 22
PIF_VALUE: 13
PIF_VALUE: 28
PIF_VALUE: 28

## 2020-03-02 ASSESSMENT — PAIN - FUNCTIONAL ASSESSMENT: PAIN_FUNCTIONAL_ASSESSMENT: 0-10

## 2020-03-02 ASSESSMENT — PAIN SCALES - GENERAL
PAINLEVEL_OUTOF10: 0

## 2020-03-02 NOTE — H&P
History and Physical Update    Original H&P done in office on 2/11/2020 (less than 30 days ago). Vitals:    03/02/20 1136   BP: (!) 161/92   Pulse: 69   Resp: 16   Temp: 97.2 °F (36.2 °C)   SpO2: 96%     Physical Exam  + skin tags on back  +soft tissue mass on back  Warm, dry    52 y/o M with soft tissue mass, skin tags    -Consent obtained in office. -Abx ordered in office.  -Reviewed expected pre-operative, operative, and post-operative courses. -Answered questions to patient's satisfaction.   -Reviewed risks, benefits, alternative to procedure.   -Proceed as scheduled.     [address] [place] [address]

## 2020-03-02 NOTE — ANESTHESIA POSTPROCEDURE EVALUATION
Department of Anesthesiology  Postprocedure Note    Patient: Shahriar Castrejon  MRN: [id number]  YOB: 1975  Date of evaluation: 3/2/2020  Time:  2:42 PM     Procedure Summary     Date:  03/02/20 Room / Location:  [address] [place] [address]    Anesthesia Start:  9362 Anesthesia Stop:  1700    Procedure:  BACK  LESION  EXCISION OF MASS , SKINTAGS X2 (N/A ) Diagnosis:  (SKIN TAGS, SOFT TISSUE MASS)    Surgeon:  Danni Lucas MD Responsible Provider:  JOE Ibrahim CRNA    Anesthesia Type:  MAC ASA Status:  3          Anesthesia Type: MAC    Umu Phase I:      Umu Phase II:      Last vitals: Reviewed and per EMR flowsheets.        Anesthesia Post Evaluation    Patient location during evaluation: PACU  Patient participation: complete - patient participated  Level of consciousness: awake and alert  Pain score: 0  Airway patency: patent  Nausea & Vomiting: no nausea and no vomiting  Complications: no  Cardiovascular status: blood pressure returned to baseline and hemodynamically stable  Respiratory status: acceptable, spontaneous ventilation and face mask  Hydration status: euvolemic

## 2020-03-02 NOTE — PROGRESS NOTES
1438- arrived to PACU in semi-fowlers position, monitors applied alarms on oriented to unit. Handoff report received from PRUDENCIO Whitman 1213  1500- more awake, denies pain. Turned and repositioned tolerated well dressings to back dry and intact   1508- Phase 1 recovery complete   1510- transferred to [facility] per cart.  Handoff report given to [facility]

## 2020-03-02 NOTE — OP NOTE
The cyst lining and contents were passed off to pathology. The cyst measured approximately 4 cm x 4 cm and was down to the muscle. The incision was irrigated and closed in two layers using 3-0 nylon and 4-0 monocryl sutures. A dressing was applied and wound care instructions were provided. Juan Jose tolerated the procedure well and without complications. The patient will be alert for any signs of cutaneous infection and will follow up as instructed.

## 2020-03-05 ENCOUNTER — HOSPITAL ENCOUNTER (OUTPATIENT)
Dept: WOUND CARE | Age: 45
Discharge: HOME | End: 2020-03-05
Payer: COMMERCIAL

## 2020-03-05 VITALS
SYSTOLIC BLOOD PRESSURE: 156 MMHG | DIASTOLIC BLOOD PRESSURE: 93 MMHG | HEART RATE: 70 BPM | TEMPERATURE: 97.6 F | RESPIRATION RATE: 16 BRPM

## 2020-03-05 PROCEDURE — 99213 OFFICE O/P EST LOW 20 MIN: CPT

## 2020-03-05 PROCEDURE — 99213 OFFICE O/P EST LOW 20 MIN: CPT | Performed by: NURSE PRACTITIONER

## 2020-03-05 NOTE — PROGRESS NOTES
copd    Cancer Father         pancreatic cancer       SOCIAL HISTORY    Social History     Tobacco Use    Smoking status: Current Every Day Smoker     Packs/day: 0.50     Years: 30.00     Pack years: 15.00     Types: Cigarettes    Smokeless tobacco: Never Used    Tobacco comment: Pts 3rd time on chantix Jan 2020   Substance Use Topics    Alcohol use: Not Currently     Comment: \"occ - average one time per month\"    Drug use: Not Currently     Types: Marijuana     Comment: last used age 19       ALLERGIES    Allergies   Allergen Reactions    Niacin Itching       MEDICATIONS    Current Outpatient Medications on File Prior to Encounter   Medication Sig Dispense Refill    HYDROcodone-acetaminophen (NORCO) 5-325 MG per tablet Take 1 tablet by mouth every 6 hours as needed for Pain for up to 3 days. Intended supply: 3 days. Take lowest dose possible to manage pain 7 tablet 0    ondansetron (ZOFRAN) 4 MG tablet Take 1 tablet by mouth daily as needed for Nausea or Vomiting 20 tablet 0    senna-docusate (PERICOLACE) 8.6-50 MG per tablet Take 2 tablets by mouth daily for 14 days 28 tablet 0    fenofibrate (TRICOR) 48 MG tablet Take 48 mg by mouth nightly      rivaroxaban (XARELTO) 20 MG TABS tablet Take 20 mg by mouth 2 times daily      Dulaglutide (TRULICITY) 1.5 RI/0.7BX SOPN Inject 1.5 mg into the skin once a week      furosemide (LASIX) 40 MG tablet Take 40 mg by mouth 2 times daily      gabapentin (NEURONTIN) 300 MG capsule Take 300 mg by mouth 2 times daily.       hydrALAZINE (APRESOLINE) 100 MG tablet Take 100 mg by mouth 2 times daily      labetalol (NORMODYNE) 200 MG tablet Take 200 mg by mouth 2 times daily      Mycophenolate Sodium 360 MG TBEC Take 360 mg by mouth 2 times daily      NIFEdipine (ADALAT CC) 60 MG extended release tablet Take 60 mg by mouth 2 times daily      predniSONE (DELTASONE) 10 MG tablet Take 10 mg by mouth daily      Sirolimus 2 MG TABS Take 2 mg by mouth daily      No

## 2020-03-12 ENCOUNTER — OFFICE VISIT (OUTPATIENT)
Dept: BARIATRICS/WEIGHT MGMT | Age: 45
End: 2020-03-12

## 2020-03-12 VITALS
HEART RATE: 64 BPM | BODY MASS INDEX: 34.01 KG/M2 | HEIGHT: 74 IN | SYSTOLIC BLOOD PRESSURE: 140 MMHG | DIASTOLIC BLOOD PRESSURE: 86 MMHG | WEIGHT: 265 LBS

## 2020-03-12 PROCEDURE — 99024 POSTOP FOLLOW-UP VISIT: CPT | Performed by: SURGERY

## 2020-03-12 NOTE — PROGRESS NOTES
Post-Operative Clinic Note    Chief Complaint   Patient presents with    Post-Op Check     1st P/O Excision of Soft Tissue Mass and Skin Tags X2 on back @ [facility] on 3/2/20         SUBJECTIVE:  Patient is here today for a post-operative visit. Patient is s/p excision of two skin tags and sebaceous cyst on 3/2/2020. No issues. Did \"bump\" one of incision sites on back and had some tenderness. Otherwise without issues. Denies F/C. Tolerating PO. Ambulating. No complaints.           Past Surgical History:   Procedure Laterality Date    KIDNEY REMOVAL Left 2011    KIDNEY TRANSPLANT  2016    rejected\"at first- able to save it\"    OTHER SURGICAL HISTORY      PD inserted in 2012- removed in 2016   [facility]  age [facility]    [place] BIOPSY OF SKIN LESION N/A 3/2/2020    BACK  LESION  EXCISION OF MASS , SKINTAGS X2 performed by Sammie Nava MD at [place] [address] PTCA  2013    Stent to RCA     Past Medical History:   Diagnosis Date    CAD (coronary artery disease)     stent to RCA, 2013- follows with Dr Reynold Boas [facility]     Diabetes mellitus [facility])     dx 2011- Dr Gisel Sanders- at [facility] ESRD (end stage renal disease) ([facility] [address]     \"started on dialysis 2012- PD dialysis untill 2016 then got kidney transplant\"    Full dentures     Heart murmur     Hemodialysis patient [facility])     not since got transplant in 2016- follows with Dr Mervin Howe in [place] at [facility] History of blood transfusion     \"with transplant\"    Hx of blood clots     superficial LLE thrombus extending into common femoral.2/2020( on Xarelto)    Hyperlipidemia     Hypertension     Renal cell carcinoma ([facility] [address] 2012    tx removed left kidney - started on PD      Family History   Problem Relation Age of Onset    Asthma Mother         copd    Cancer Father         pancreatic cancer     Social History     Socioeconomic History    Marital status:      Spouse name: Not on file    Number of children: 10    Years of education: Not on file  Highest education level: Not on file   Occupational History    Not on file   Social Needs    Financial resource strain: Not on file    Food insecurity     Worry: Not on file     Inability: Not on file    Transportation needs     Medical: Not on file     Non-medical: Not on file   Tobacco Use    Smoking status: Current Every Day Smoker     Packs/day: 0.50     Years: 30.00     Pack years: 15.00     Types: Cigarettes    Smokeless tobacco: Never Used    Tobacco comment: Pts 3rd time on chantix Jan 2020   Substance and Sexual Activity    Alcohol use: Not Currently     Comment: \"occ - average one time per month\"    Drug use: Not Currently     Types: Marijuana     Comment: last used age 19   [place] Sexual activity: Not on file   Lifestyle    Physical activity     Days per week: Not on file     Minutes per session: Not on file    Stress: Not on file   Relationships    Social connections     Talks on phone: Not on file     Gets together: Not on file     Attends [employer] service: Not on file     Active member of club or organization: Not on file     Attends meetings of clubs or organizations: Not on file     Relationship status: Not on file    Intimate partner violence     Fear of current or ex partner: Not on file     Emotionally abused: Not on file     Physically abused: Not on file     Forced sexual activity: Not on file   Other Topics Concern    Not on file   Social History Narrative    Not on file       OBJECTIVE:  Physical Exam  A&Ox3, NAD at rest.   AT. NC. Breathing unlabored. RRR. S, NT, ND, no PS. Back incisions are C/D/I with sutures in place. SIU. Warm, dry. Pathology report reveals:   Final Pathologic Diagnosis:  A.  Skin biopsy, skin tag #1:  Soft fibroma (skin tag). Gaetana Nickel biopsy, skin tag #2:  Soft fibroma (skin tag).      C.  Skin biopsy, clinically contents of sebaceous cyst:         Ruptured epidermal cyst with focal fibrosis and mild  chronic inflammation.

## 2020-03-17 ENCOUNTER — OFFICE VISIT (OUTPATIENT)
Dept: BARIATRICS/WEIGHT MGMT | Age: 45
End: 2020-03-17

## 2020-03-17 VITALS
DIASTOLIC BLOOD PRESSURE: 88 MMHG | SYSTOLIC BLOOD PRESSURE: 132 MMHG | BODY MASS INDEX: 34.12 KG/M2 | HEART RATE: 72 BPM | HEIGHT: 74 IN | WEIGHT: 265.9 LBS

## 2020-03-17 PROCEDURE — 99024 POSTOP FOLLOW-UP VISIT: CPT | Performed by: SURGERY

## 2020-03-17 NOTE — PROGRESS NOTES
Post-Operative Clinic Note    Chief Complaint   Patient presents with    Post-Op Check     2nd P/O Excision Soft Tissue Mass and 2 Skin Tags @ [facility] on 3/2/20          SUBJECTIVE:  Patient is here today for a post-operative visit. Patient is s/p excision of multiple soft tissue masses on back on 3/2/2020. One of the incision opened this week in the pt's sleep and wanted to have it checked. Denies bleeding. Denies purulent drainage. Denies F/C. No pain or other complaints.       Past Surgical History:   Procedure Laterality Date    KIDNEY REMOVAL Left 2011    KIDNEY TRANSPLANT  2016    rejected\"at first- able to save it\"    OTHER SURGICAL HISTORY      PD inserted in 2012- removed in 2016   [facility]  age 19    [place] BIOPSY OF SKIN LESION N/A 3/2/2020    BACK  LESION  EXCISION OF MASS , SKINTAGS X2 performed by Bj Mantilla MD at [place] [id number] PTCA  2013    Stent to RCA     Past Medical History:   Diagnosis Date    CAD (coronary artery disease)     stent to RCA, 2013- follows with Dr Duenas Expose [facility])     Diabetes mellitus [facility])     dx 2011- Dr Yesy Bejarano- at [facility] ESRD (end stage renal disease) ([facility] [address]     \"started on dialysis 2012- PD dialysis untill 2016 then got kidney transplant\"    Full dentures     Heart murmur     Hemodialysis patient [facility])     not since got transplant in 2016- follows with Dr Gem Monroe in [place] at [facility] History of blood transfusion     \"with transplant\"    Hx of blood clots     superficial LLE thrombus extending into common femoral.2/2020( on Xarelto)    Hyperlipidemia     Hypertension     Renal cell carcinoma ([facility] [address] 2012    tx removed left kidney - started on PD      Family History   Problem Relation Age of Onset    Asthma Mother         copd    Cancer Father         pancreatic cancer     Social History     Socioeconomic History    Marital status:      Spouse name: Not on file    Number of children: 10    Years of education: Not on file   [place] Rule this encounter. No orders of the defined types were placed in this encounter. Follow Up: No follow-ups on file.     Kim La MD

## 2020-04-06 ENCOUNTER — TELEPHONE (OUTPATIENT)
Dept: BARIATRICS/WEIGHT MGMT | Age: 45
End: 2020-04-06

## 2020-04-09 ENCOUNTER — TELEPHONE (OUTPATIENT)
Dept: BARIATRICS/WEIGHT MGMT | Age: 45
End: 2020-04-09

## (undated) DEVICE — PACK,BASIC,IX: Brand: MEDLINE

## (undated) DEVICE — SYRINGE IRRIG 60ML SFT PLIABLE BLB EZ TO GRP 1 HND USE W/

## (undated) DEVICE — COUNTER NDL 30 COUNT FOAM STRP SGL MAG

## (undated) DEVICE — TOWEL,OR,DSP,ST,BLUE,STD,6/PK,12PK/CS: Brand: MEDLINE

## (undated) DEVICE — TUBING, SUCTION, 9/32" X 10', STRAIGHT: Brand: MEDLINE

## (undated) DEVICE — MARKER SURG SKIN UTIL REGULAR/FINE 2 TIP W/ RUL AND 9 LBL

## (undated) DEVICE — CHLORAPREP 26ML ORANGE

## (undated) DEVICE — SHEET, T, LAPAROTOMY, STERILE: Brand: MEDLINE

## (undated) DEVICE — SUTURE VCRL SZ 3-0 L27IN ABSRB UD L26MM SH 1/2 CIR J416H

## (undated) DEVICE — GAUZE,SPONGE,4"X4",16PLY,XRAY,STRL,LF: Brand: MEDLINE

## (undated) DEVICE — GLOVE SURG SZ 7 CRM LTX FREE POLYISOPRENE POLYMER BEAD ANTI

## (undated) DEVICE — CONTAINER,SPECIMEN,OR STERILE,4OZ: Brand: MEDLINE

## (undated) DEVICE — Z DUP USE 2257490 ADHESIVE SKIN CLSRE 036ML TPCL 2CTL CNCRLTE HIGH VSCSTY DRMB

## (undated) DEVICE — LINER,SEMI-RIGID,3000CC,50EA/CS: Brand: MEDLINE

## (undated) DEVICE — BLADE CLIPPER GEN PURP NS

## (undated) DEVICE — GLOVE SURG SZ 75 CRM LTX FREE POLYISOPRENE POLYMER BEAD ANTI

## (undated) DEVICE — SPONGE GZ W4XL8IN COT WVN 12 PLY

## (undated) DEVICE — GLOVE ORANGE PI 7 1/2   MSG9075

## (undated) DEVICE — PENCIL ES CRD L10FT HND SWCHING ROCK SWCH W/ EDGE COAT BLDE

## (undated) DEVICE — SUTURE MCRYL SZ 4-0 L18IN ABSRB UD L19MM PS-2 3/8 CIR PRIM Y496G

## (undated) DEVICE — SOLUTION IV IRRIG WATER 1000ML POUR BRL 2F7114

## (undated) DEVICE — INTENDED FOR TISSUE SEPARATION, AND OTHER PROCEDURES THAT REQUIRE A SHARP SURGICAL BLADE TO PUNCTURE OR CUT.: Brand: BARD-PARKER ® STAINLESS STEEL BLADES

## (undated) DEVICE — DRAPE SHEET ULTRAGARD: Brand: MEDLINE

## (undated) DEVICE — ELECTRODE ES AD CRDLSS PT RET REM POLYHESIVE

## (undated) DEVICE — DISCONTINUED USE 111569 BF APPLICATOR COTN TIP 6IN ST